# Patient Record
Sex: FEMALE | Race: WHITE | Employment: OTHER | URBAN - METROPOLITAN AREA
[De-identification: names, ages, dates, MRNs, and addresses within clinical notes are randomized per-mention and may not be internally consistent; named-entity substitution may affect disease eponyms.]

---

## 2020-11-11 ENCOUNTER — HOSPITAL ENCOUNTER (OUTPATIENT)
Age: 85
Discharge: HOME | End: 2020-11-11
Payer: MEDICARE

## 2020-11-11 VITALS
RESPIRATION RATE: 18 BRPM | HEART RATE: 57 BPM | DIASTOLIC BLOOD PRESSURE: 58 MMHG | WEIGHT: 134 LBS | OXYGEN SATURATION: 95 % | SYSTOLIC BLOOD PRESSURE: 137 MMHG | TEMPERATURE: 99 F

## 2020-11-11 DIAGNOSIS — Z20.822 ENCOUNTER FOR LABORATORY TESTING FOR COVID-19 VIRUS: Primary | ICD-10-CM

## 2020-11-11 PROCEDURE — 99202 OFFICE O/P NEW SF 15 MIN: CPT | Performed by: PHYSICIAN ASSISTANT

## 2020-11-11 RX ORDER — WARFARIN SODIUM 2.5 MG/1
2.5 TABLET ORAL NIGHTLY
COMMUNITY

## 2020-11-11 RX ORDER — AMLODIPINE BESYLATE 10 MG/1
10 TABLET ORAL DAILY
COMMUNITY

## 2020-11-11 RX ORDER — ALBUTEROL SULFATE 90 UG/1
AEROSOL, METERED RESPIRATORY (INHALATION) EVERY 6 HOURS PRN
COMMUNITY

## 2020-11-11 RX ORDER — MELATONIN
325
COMMUNITY

## 2020-11-11 RX ORDER — WARFARIN SODIUM 5 MG/1
5 TABLET ORAL NIGHTLY
COMMUNITY

## 2020-11-11 NOTE — ED PROVIDER NOTES
Patient Seen in: [facility] [address] [place] [address]      History   Patient presents with:  Testing    Stated Complaint: covid test-exposure    HPI    CHIEF COMPLAINT: Sore throat, COVID-19 exposure    HISTORY OF PRESENT ILLNESS: Patient is a pleasant 87-yea [birth year]   Temp 11/11/20 1631 98.9 °F (37.2 °C)   Temp src --    SpO2 11/11/20 1631 95 %   O2 Device 11/11/20 1630 None (Room air)       Current:/58   Pulse 57   Temp 98.9 °F (37.2 °C)   Resp 18   Wt 60.8 kg   SpO2 95%         Physical Exam    Vital s agree with and understand  discharge instructions and plan. I answered all of the patient's questions prior to discharge. Patient felt comfortable going home.              Disposition and Plan     Clinical Impression:  Encounter for laboratory testing for

## 2020-11-16 NOTE — ED NOTES
Pt called for COVID-19 results, results pending, pt notified. Will look into these results coming back. Pt aware.